# Patient Record
Sex: FEMALE | Race: WHITE | HISPANIC OR LATINO | Employment: UNEMPLOYED | URBAN - METROPOLITAN AREA
[De-identification: names, ages, dates, MRNs, and addresses within clinical notes are randomized per-mention and may not be internally consistent; named-entity substitution may affect disease eponyms.]

---

## 2023-09-25 ENCOUNTER — OFFICE VISIT (OUTPATIENT)
Dept: OBSTETRICS AND GYNECOLOGY | Facility: CLINIC | Age: 28
End: 2023-09-25

## 2023-09-25 VITALS
BODY MASS INDEX: 30.95 KG/M2 | WEIGHT: 168.2 LBS | SYSTOLIC BLOOD PRESSURE: 120 MMHG | HEIGHT: 62 IN | DIASTOLIC BLOOD PRESSURE: 70 MMHG

## 2023-09-25 DIAGNOSIS — Z30.09 BIRTH CONTROL COUNSELING: Primary | ICD-10-CM

## 2023-09-25 NOTE — PROGRESS NOTES
Chief Complaint  New GYN (Patient is here today to discuss birth control. Last pap smear was 10/12/22 Neg.)    Entirety of today's encounter including patient interview, exam, and counseling performed with the aid of a medical  via the telephone.     Miguel Angel Irwin presents to [facility]  History of Present Illness  Patient is here to discuss birth control.  She recently had a baby in 2022.  She is not breast-feeding.  She previously has used Depo-Provera injections for contraception.  She says that she is interested in something that might be more long-acting.  She is otherwise in her usual state of health.  She is without gynecologic complaints.    Menstrual History:  OB History          1    Para   1    Term   1            AB        Living   1         SAB        IAB        Ectopic        Molar        Multiple        Live Births   1                 Patient's last menstrual period was 2023 (approximate).     History reviewed. No pertinent past medical history.    History reviewed. No pertinent surgical history.\\  Family History   Problem Relation Age of Onset    Breast cancer Neg Hx     Ovarian cancer Neg Hx     Uterine cancer Neg Hx     Colon cancer Neg Hx      Social History     Tobacco Use    Smoking status: Never    Smokeless tobacco: Never   Substance Use Topics    Alcohol use: Never    Drug use: Never     No current outpatient medications on file prior to visit.     No current facility-administered medications on file prior to visit.     No Known Allergies    Review of systems:  Constitutional: No fevers, chills, sweats   Eye: No recent visual problems, denies blurry vision   HEENT: No ear pain, nasal congestion, sore throat, voice changes   Respiratory: No shortness of breath, cough, pain on breathing   Cardiovascular: No Chest pain, palpitations   Gastrointestinal: No nausea, vomiting, diarrhea, constipation  "  Genitourinary: No hematuria, dysuria, lesions on genitalia   Hema/Lymph: Negative for bruising, no edema   Endocrine: Negative for excessive thirst, excessive hunger, heat or cold intolerance   Musculoskeletal: No joint pain, muscle pain, decreased range of motion   Integumentary: No rash, pruritus, abrasions, lesions   Neurologic: No weakness, numbness, headaches   Psychiatric: No anxiety, depression, mood changes         Objective   Vital Signs:  /70   Ht 157.5 cm (62\")   Wt 76.3 kg (168 lb 3.2 oz)   BMI 30.76 kg/m²   Estimated body mass index is 30.76 kg/m² as calculated from the following:    Height as of this encounter: 157.5 cm (62\").    Weight as of this encounter: 76.3 kg (168 lb 3.2 oz).             Physical Exam   General: No acute distress, awake and oriented x3  Psychiatric: good judgment and insight, normal mood  Neurological: cranial nerves II through XII intact, no deficits    Result Review :          GC/CT:  Units 11 mo ago Comments   Chlamydia Trachomatis  Negative Negative This assay uses transcription mediated amplification to detect ribosomal RNA sequences of Chlamydia trachomatis. Limit of detection is 1 inclusion-forming unit per assay for C. trachomatis. Detection of C. trachomatis has a measured sensitivity of 94% to   98% and specificity of 97% to 99%.  This test is FDA approved for in vitro diagnostic testing on cervical, vaginal, and urethral swabs and on male urine specimens.  Performance on rectal, throat, and female urine specimens has been validated by [facility] laboratory.   Neisseria Gonorrhoeae  Negative Negative This assay uses transcription mediated amplification to detect ribosomal RNA sequences of Neiseria gonorrhoeae. Limit of detection is 50 cells per assay for N. gonorrhoeae. Detection of N. gonorrhoeae has a measured sensitivity of 91% to 99% and  specificity of 98% to 99.6%.  This test is FDA approved for in vitro diagnostic testing on cervical, vaginal, and urethral " swabs and on male urine specimens.  Performance on rectal, throat, and female urine specimens has been validated by [facility] laboratory.   Resulting Agency [facility]    Specimen Collected: 10/12/22 15:31 Last Resulted: 10/13/22 02:22     Pap 10/2022:  GYNECOLOGIC CYTOLOGY REPORT       DIAGNOSIS:     NEGATIVE (No evidence of intraepithelial lesions or malignancy)     SPECIMEN ADEQUACY:   Satisfactory for evaluation: Endocervical cells present.        **Electronically Signed Out By  Geeta Miller on 10/26/2022 **          Assessment and Plan   Diagnoses and all orders for this visit:    1. Birth control counseling (Primary)    Various contraceptive options discussed at length including combined oral contraceptives, contraceptive patch, NuvaRing, progesterone only contraceptive, Depo-Provera, Nexplanon, progesterone IUD, copper IUD, and barrier methods.  The risks, benefits, and alternatives were discussed.  The patient elects for Nexplanon.  Side effects such as irregular bleeding, weight gain, breast tenderness, etc. were discussed with the patient.  She verbalizes understanding.  Patient educational materials on this form of birth control were given to the patient.  She will return in 2 days for nexplanon insertion. She is encouraged to use condoms as a backup method of birth control until she has her Nexplanon inserted and for 7 days after insertion.    I spent 30 minutes today on the care of this patient, including review of the chart and any pertinent prior imaging and labs, interview/exam of the patient, developing care plan, and counseling the patient on management options and excluding any time spent on other separate billable services such as performing procedures or test interpretation, if applicable.       Follow Up   Return in about 2 days (around 9/27/2023), or nexplanon insertion.  Patient was given instructions and counseling regarding her condition or for health maintenance advice. Please see specific  information pulled into the AVS if appropriate.

## 2023-09-27 ENCOUNTER — OFFICE VISIT (OUTPATIENT)
Dept: OBSTETRICS AND GYNECOLOGY | Facility: CLINIC | Age: 28
End: 2023-09-27
Payer: COMMERCIAL

## 2023-09-27 VITALS
DIASTOLIC BLOOD PRESSURE: 70 MMHG | BODY MASS INDEX: 30.69 KG/M2 | HEIGHT: 62 IN | SYSTOLIC BLOOD PRESSURE: 120 MMHG | WEIGHT: 166.8 LBS

## 2023-09-27 DIAGNOSIS — Z30.013 INITIATION OF DEPO PROVERA: Primary | ICD-10-CM

## 2023-09-27 RX ORDER — MEDROXYPROGESTERONE ACETATE 150 MG/ML
150 INJECTION, SUSPENSION INTRAMUSCULAR
Qty: 1 ML | Refills: 3 | Status: SHIPPED | OUTPATIENT
Start: 2023-09-27 | End: 2023-09-28 | Stop reason: SDUPTHER

## 2023-09-27 NOTE — PROGRESS NOTES
"Chief Complaint  Gynecologic Exam (Patient is here today for a nexplanon insertion but is now second guessing and would popossibly like to do something else for birth control. Thinking she wants to to the depo shot. )    Subjective        Alexia Irwin presents to [facility]  History of Present Illness  Patient had seen me in the office 2 days ago for birth control counseling and had at that time elected to proceed with Nexplanon insertion.  She was to come back in today to have the Nexplanon inserted; however, now she states she has changed her mind and would like to receive Depo-Provera injections.    The following portions of the patient's history were reviewed and updated as appropriate: allergies, current medications, past family history, past medical history, past social history, past surgical history, and problem list.    Objective   Vital Signs:  /70   Ht 157.5 cm (62\")   Wt 75.7 kg (166 lb 12.8 oz)   BMI 30.51 kg/m²   Estimated body mass index is 30.51 kg/m² as calculated from the following:    Height as of this encounter: 157.5 cm (62\").    Weight as of this encounter: 75.7 kg (166 lb 12.8 oz).             Physical Exam   General: No acute distress, awake and oriented x3  Psychiatric: good judgment and insight, normal mood  Neurological: cranial nerves II through XII intact, no deficits    Result Review :                   Assessment and Plan   Diagnoses and all orders for this visit:    1. Initiation of Depo Provera (Primary)  -     medroxyPROGESTERone (DEPO-PROVERA) 150 MG/ML injection; Inject 1 mL into the appropriate muscle as directed by prescriber Every 3 (Three) Months.  Dispense: 1 mL; Refill: 3    Patient had previously opted for Nexplanon, but she has changed her mind at this time.    Various contraceptive options discussed including combined oral contraceptives, contraceptive patch, NuvaRing, progesterone only contraceptive, Depo-Provera, Nexplanon, " progesterone IUD, copper IUD, and barrier methods. Paitent elects for depo provera. Risks, benefits, alternatives discussed at length.  Discussed typical side effects such as breakthrough bleeding, weight gain, and progesterone like side effects such as acne, facial hair growth, etc.  Discussed condoms in first month for backup method. Also discussed condoms for STD prevention. All questions answered.            Follow Up   Return in about 1 day (around 9/28/2023), or RN visit for depo provera.  Patient was given instructions and counseling regarding her condition or for health maintenance advice. Please see specific information pulled into the AVS if appropriate.

## 2023-09-28 ENCOUNTER — TELEPHONE (OUTPATIENT)
Dept: OBSTETRICS AND GYNECOLOGY | Facility: CLINIC | Age: 28
End: 2023-09-28
Payer: COMMERCIAL

## 2023-09-28 DIAGNOSIS — Z30.013 INITIATION OF DEPO PROVERA: ICD-10-CM

## 2023-09-28 RX ORDER — MEDROXYPROGESTERONE ACETATE 150 MG/ML
150 INJECTION, SUSPENSION INTRAMUSCULAR
Qty: 1 ML | Refills: 3 | Status: SHIPPED | OUTPATIENT
Start: 2023-09-28

## 2023-09-28 NOTE — TELEPHONE ENCOUNTER
Pt called to request her rx for depo be switch to [facility] on [address] (updated in EPIC).    States the original pharmacy she chose ([facility]) is temporarily closed.     Please resend rx to [facility].    Thanks,   Mirta    Pt # [phone]

## 2023-10-02 ENCOUNTER — CLINICAL SUPPORT (OUTPATIENT)
Dept: OBSTETRICS AND GYNECOLOGY | Facility: CLINIC | Age: 28
End: 2023-10-02
Payer: COMMERCIAL

## 2023-10-02 DIAGNOSIS — Z30.013 INITIATION OF DEPO PROVERA: Primary | ICD-10-CM

## 2023-10-02 LAB
B-HCG UR QL: NEGATIVE
EXPIRATION DATE: NORMAL
INTERNAL NEGATIVE CONTROL: NEGATIVE
INTERNAL POSITIVE CONTROL: POSITIVE
Lab: NORMAL

## 2023-10-02 RX ORDER — MEDROXYPROGESTERONE ACETATE 150 MG/ML
150 INJECTION, SUSPENSION INTRAMUSCULAR ONCE
Status: COMPLETED | OUTPATIENT
Start: 2023-10-02 | End: 2023-10-02

## 2023-10-02 RX ADMIN — MEDROXYPROGESTERONE ACETATE 150 MG: 150 INJECTION, SUSPENSION INTRAMUSCULAR at 16:03

## 2023-10-02 NOTE — PROGRESS NOTES
Pt here for first depo prov inj. RT D    Lot#:[id number]  EXP:10/2025  NDC:22468-384-03    Pt tolerated injection